# Patient Record
Sex: FEMALE | Race: WHITE | NOT HISPANIC OR LATINO | Employment: OTHER | URBAN - NONMETROPOLITAN AREA
[De-identification: names, ages, dates, MRNs, and addresses within clinical notes are randomized per-mention and may not be internally consistent; named-entity substitution may affect disease eponyms.]

---

## 2018-05-16 ENCOUNTER — OFFICE VISIT (OUTPATIENT)
Dept: NEUROSURGERY | Facility: CLINIC | Age: 68
End: 2018-05-16

## 2018-05-16 VITALS
WEIGHT: 204.6 LBS | BODY MASS INDEX: 40.17 KG/M2 | DIASTOLIC BLOOD PRESSURE: 92 MMHG | SYSTOLIC BLOOD PRESSURE: 168 MMHG | HEIGHT: 60 IN

## 2018-05-16 DIAGNOSIS — Z78.9 NON-SMOKER: ICD-10-CM

## 2018-05-16 DIAGNOSIS — M51.37 DEGENERATION OF LUMBAR OR LUMBOSACRAL INTERVERTEBRAL DISC: ICD-10-CM

## 2018-05-16 DIAGNOSIS — M48.061 SPINAL STENOSIS, LUMBAR REGION, WITHOUT NEUROGENIC CLAUDICATION: Primary | ICD-10-CM

## 2018-05-16 DIAGNOSIS — M54.6 PAIN IN THORACIC SPINE: ICD-10-CM

## 2018-05-16 PROCEDURE — 99204 OFFICE O/P NEW MOD 45 MIN: CPT | Performed by: NURSE PRACTITIONER

## 2018-05-16 RX ORDER — GABAPENTIN 600 MG/1
600 TABLET ORAL 3 TIMES DAILY
COMMUNITY

## 2018-05-16 RX ORDER — FUROSEMIDE 40 MG/1
40 TABLET ORAL 2 TIMES DAILY
COMMUNITY

## 2018-05-16 RX ORDER — DICLOFENAC SODIUM 75 MG/1
TABLET, DELAYED RELEASE ORAL
Refills: 1 | COMMUNITY
Start: 2018-04-26

## 2018-05-16 RX ORDER — SIMVASTATIN 40 MG
TABLET ORAL DAILY
Refills: 1 | COMMUNITY
Start: 2018-03-14

## 2018-05-16 RX ORDER — MECLIZINE HYDROCHLORIDE 25 MG/1
TABLET ORAL
Refills: 0 | COMMUNITY
Start: 2018-03-15

## 2018-05-16 RX ORDER — NITROFURANTOIN 25; 75 MG/1; MG/1
CAPSULE ORAL
COMMUNITY
Start: 2018-04-03

## 2018-05-16 RX ORDER — CETIRIZINE HYDROCHLORIDE 10 MG/1
TABLET ORAL DAILY
Refills: 0 | COMMUNITY
Start: 2018-03-15

## 2018-05-16 RX ORDER — GLIMEPIRIDE 2 MG/1
TABLET ORAL DAILY
Refills: 1 | COMMUNITY
Start: 2018-03-14

## 2018-05-16 NOTE — PATIENT INSTRUCTIONS

## 2018-05-16 NOTE — PROGRESS NOTES
Chief complaint:   Chief Complaint   Patient presents with   • Back Pain     Patient has been referred today for her back and right hip pain, she brings a CT scan with her today for review.  She states she has not tried any physical therapy for her back pain.         Subjective     HPI: This is a 67-year-old female patient who is referred to us by Dr. Blum for back and leg pain.  She is here to be evaluated.  The patient is not a good historian.  She says her back pain is been going on for about 3-4 years.  Pain in her back is constant.  Its worse when she is walking and better when she standing still.  She does complain of some leg pain bilaterally with the right being worse than the left.  She states this pain is intermittent.  It is worse at night and says that she does have cramping pain but has pain shooting down her legs to go back to her knees.  Nothing really makes it better.  Denies any bowel  Incontinence but does say that she has some bladder incontinence.  She says that she has been using a cane for quite some time but has also started using a walker as well.  She is right-hand dominant.  She is retired.  She is .  She says that she has been having some falls and does have difficulty with .  Rates her pain on scale 0-10 at a 10.  She says it does interfere with activities of daily living.  Past medical history is significant for diabetes.    Review of Systems   Eyes: Positive for photophobia, pain, discharge, itching and visual disturbance.   Respiratory: Positive for cough, choking, chest tightness and wheezing.    Cardiovascular: Positive for chest pain.   Gastrointestinal: Positive for abdominal distention, abdominal pain, anal bleeding, blood in stool and constipation.   Endocrine: Positive for polydipsia.   Genitourinary: Positive for dyspareunia, frequency, pelvic pain and urgency.   Musculoskeletal: Positive for back pain.   Allergic/Immunologic: Positive for environmental  "allergies and food allergies.   Psychiatric/Behavioral: Positive for hallucinations and sleep disturbance. The patient is nervous/anxious and is hyperactive.    All other systems reviewed and are negative.       Past Medical History:   Diagnosis Date   • Arthritis    • Diabetes    • Hypertension      Past Surgical History:   Procedure Laterality Date   • APPENDECTOMY     • CARPAL TUNNEL RELEASE Right    • PARTIAL HYSTERECTOMY     • REPLACEMENT TOTAL KNEE Left    • TONSILLECTOMY       Family History   Problem Relation Age of Onset   • Alcohol abuse Father    • Hypertension Father    • Asthma Brother      Social History   Substance Use Topics   • Smoking status: Never Smoker   • Smokeless tobacco: Never Used   • Alcohol use No       (Not in a hospital admission)  Allergies:  Penicillins and Latex    Objective      Vital Signs  /92 (BP Location: Right arm, Patient Position: Sitting)   Ht 152.4 cm (60\")   Wt 92.8 kg (204 lb 9.6 oz)   BMI 39.96 kg/m²     Physical Exam   Constitutional: She is oriented to person, place, and time. She appears well-developed and well-nourished.   HENT:   Head: Normocephalic.   Eyes: Conjunctivae, EOM and lids are normal. Pupils are equal, round, and reactive to light.   Neck: Normal range of motion.   Cardiovascular: Normal rate, regular rhythm and normal heart sounds.    Pulmonary/Chest: Effort normal and breath sounds normal.   Abdominal: Normal appearance.   Musculoskeletal: Normal range of motion.   Neurological: She is alert and oriented to person, place, and time. She has normal strength. She displays normal reflexes. No cranial nerve deficit or sensory deficit. Gait abnormal. GCS eye subscore is 4. GCS verbal subscore is 5. GCS motor subscore is 6.   Reflex Scores:       Tricep reflexes are 0 on the right side and 0 on the left side.       Bicep reflexes are 0 on the right side and 0 on the left side.       Brachioradialis reflexes are 0 on the right side and 0 on the left " side.       Patellar reflexes are 0 on the right side and 0 on the left side.       Achilles reflexes are 0 on the right side and 0 on the left side.  Unable to tandem walk.  No Hoffmans or clonus visualized.   Skin: Skin is warm.   Psychiatric: She has a normal mood and affect. Her speech is normal and behavior is normal. Thought content normal. Cognition and memory are normal.       Results Review: CT scan of the lumbar spine shows the patient does have lumbar disc degeneration lumbar stenosis present throughout her lumbar spine.  It does appear to be most prominent at L3-4 this far as the lumbar stenosis is present.  It does appear that she has a disc also find at L2-3.          Assessment/Plan: At this point I am a little concerned because the patients having quite a bit of difficulty with walking.  She almost has a myelopathic gait.  She is not really seem to be exhibiting any upper extremity myelopathic issues.  I am going to send  her for an MRI of her thoracic and lumbar spine to see if we can localize for her problem is a little better see why she is having the difficulty with her walking and why she is in so much pain.  Depending on what the MRI shows we send the patient to physical therapy.  BMI shows that she is very overweight.  BMI chart was given the patient.  She is a nonsmoker.      Cecille was seen today for back pain.    Diagnoses and all orders for this visit:    Spinal stenosis, lumbar region, without neurogenic claudication  -     MRI Lumbar Spine Without Contrast; Future    Pain in thoracic spine  -     MRI Thoracic Spine Without Contrast; Future    Degeneration of lumbar or lumbosacral intervertebral disc  -     MRI Lumbar Spine Without Contrast; Future    Non-smoker    BMI 39.0-39.9,adult          I discussed the patients findings and my recommendations with patient    Eric Mccormick, APRN  05/16/18  12:01 PM

## 2018-06-06 ENCOUNTER — HOSPITAL ENCOUNTER (OUTPATIENT)
Dept: MRI IMAGING | Facility: HOSPITAL | Age: 68
Discharge: HOME | End: 2018-06-06
Admitting: NURSE PRACTITIONER

## 2018-06-06 ENCOUNTER — OFFICE VISIT (OUTPATIENT)
Dept: NEUROSURGERY | Facility: CLINIC | Age: 68
End: 2018-06-06

## 2018-06-06 ENCOUNTER — HOSPITAL ENCOUNTER (OUTPATIENT)
Dept: MRI IMAGING | Facility: HOSPITAL | Age: 68
Discharge: HOME | End: 2018-06-06

## 2018-06-06 VITALS
SYSTOLIC BLOOD PRESSURE: 150 MMHG | BODY MASS INDEX: 40.23 KG/M2 | HEIGHT: 60 IN | WEIGHT: 204.9 LBS | DIASTOLIC BLOOD PRESSURE: 75 MMHG

## 2018-06-06 DIAGNOSIS — Z78.9 NON-SMOKER: ICD-10-CM

## 2018-06-06 DIAGNOSIS — R26.9 ABNORMALITY OF GAIT: ICD-10-CM

## 2018-06-06 DIAGNOSIS — M48.061 SPINAL STENOSIS, LUMBAR REGION, WITHOUT NEUROGENIC CLAUDICATION: ICD-10-CM

## 2018-06-06 DIAGNOSIS — M54.6 PAIN IN THORACIC SPINE: ICD-10-CM

## 2018-06-06 DIAGNOSIS — M51.37 DEGENERATION OF LUMBAR OR LUMBOSACRAL INTERVERTEBRAL DISC: ICD-10-CM

## 2018-06-06 DIAGNOSIS — M51.37 DEGENERATION OF LUMBAR OR LUMBOSACRAL INTERVERTEBRAL DISC: Primary | ICD-10-CM

## 2018-06-06 PROCEDURE — 72146 MRI CHEST SPINE W/O DYE: CPT

## 2018-06-06 PROCEDURE — 99213 OFFICE O/P EST LOW 20 MIN: CPT | Performed by: NURSE PRACTITIONER

## 2018-06-06 PROCEDURE — 72148 MRI LUMBAR SPINE W/O DYE: CPT

## 2018-06-06 NOTE — PROGRESS NOTES
"    Chief complaint:   Chief Complaint   Patient presents with   • Back Pain     Cecille returns today after MRIs of her lumbar and thoracic spine, she is here for the results.  States she is hurting more all the time and has difficulty with her legs and walking.         Subjective     HPI: This is a 67-year-old who we have seen in the past for back pain and difficulty with ambulation.  I will also look concerned about the patient having a myelopathic gait.  Her gait does appear to be better today.  She is walking with a cane and not with a walker today.  She says that she still does have pain in her back and her hip regions but the pain does appear to be improved compared to the last visit.  She rates her pain on scale 0-10 at a 4.  She says it can interfere with activities of daily living if he gets intense but her biggest complaint today is just very intermittent issues of walking.  She denies any bowel or bladder incontinence.  She does walk with a walker.    Review of Systems   Musculoskeletal: Positive for back pain.   Neurological: Positive for weakness.         Objective      Vital Signs  /75 (BP Location: Right arm, Patient Position: Sitting)   Ht 152.4 cm (60\")   Wt 92.9 kg (204 lb 14.4 oz)   BMI 40.02 kg/m²     Physical Exam   Constitutional: She is oriented to person, place, and time. She appears well-developed and well-nourished.   HENT:   Head: Normocephalic.   Eyes: Conjunctivae, EOM and lids are normal. Pupils are equal, round, and reactive to light.   Neck: Normal range of motion.   Cardiovascular: Normal rate, regular rhythm and normal heart sounds.    Pulmonary/Chest: Effort normal and breath sounds normal.   Abdominal: Normal appearance.   Musculoskeletal: Normal range of motion.   Neurological: She is alert and oriented to person, place, and time. She has normal strength and normal reflexes. She displays normal reflexes. No cranial nerve deficit or sensory deficit. Gait normal. GCS " eye subscore is 4. GCS verbal subscore is 5. GCS motor subscore is 6.   Unable to tandem walk.  No Hoffmans or clonus visualized.   Skin: Skin is warm.   Psychiatric: She has a normal mood and affect. Her speech is normal and behavior is normal. Thought content normal. Cognition and memory are normal.       Results Review: Patient has mild degeneration in her lumbar spine.  There is no significant area of stenosis and lumbar spine that I can appreciate.  No significant area of stenosis in her thoracic spine.  No evidence of any thing that would be causing symptoms of myelopathy and her thoracic or lumbar spine.  No cord signal change.  In a  view I do not see any significant compression in her cervical spine.          Assessment/Plan: At this point I am not really sure why the patient has periods of difficulty with her ambulation.  She states that she was told the past that she had had symptoms of Parkinson's and she was evaluated by neurologist but she does not remember what he told her at this time.  At this point I think would be beneficial for the patient to go see a neurologist to look and see if they can come up with a reason for the patient to have periods of difficulty with ambulation.  At this point we will need to see her on an as any basis.  BMI shows that she is very overweight.  BMI chart was given the patient.  She is a nonsmoker        Cecille was seen today for back pain.    Diagnoses and all orders for this visit:    Degeneration of lumbar or lumbosacral intervertebral disc  -     Ambulatory Referral to Neurology    Spinal stenosis, lumbar region, without neurogenic claudication    Abnormality of gait    Non-smoker    BMI 39.0-39.9,adult        I discussed the patients findings and my recommendations with patient  Eric Mccormick, OSWALDO  06/06/18  2:04 PM

## 2018-07-16 ENCOUNTER — TELEPHONE (OUTPATIENT)
Dept: NEUROLOGY | Age: 68
End: 2018-07-16

## 2019-04-09 ENCOUNTER — TELEPHONE (OUTPATIENT)
Dept: NEUROSURGERY | Age: 69
End: 2019-04-09

## 2019-04-10 ENCOUNTER — TELEPHONE (OUTPATIENT)
Dept: NEUROSURGERY | Age: 69
End: 2019-04-10

## 2019-04-10 NOTE — TELEPHONE ENCOUNTER
Referring physician: HERB Gilman  Who is completing questionnaire: Patient  Reason for referral/symptoms:   Headaches   No   Neck Pain   No  Back Pain   Yes: Mid  Lower  Leg pain left  Numbness left  Bowel/Bladder Incontinence  Gait Problems  VNS   No   CTS    No   Has the patient had any previous brain/neck/back surgeries:  Yes: What was the surgery: Disc Replacement  When was the surgery: 5649-8000, unsure  Who was the surgeon: Pt does not remember  What facility was the surgery: [facility]  Is this a second opinion: No  Why is the patient not following up with the previous surgeon: No longer practicing  Was a MRI performed within the last year:  Yes: Where was the imaging preformed: [facility], What Part of Body: L-Spine and Date of Service: 6/6/18  Other imaging obtained:  Yes CT Yes: Where was the imaging preformed: [facility], What Part of Body: L-Spine and Date of Service: 04/18/18  Patient able to have MRI  Yes  Has patient had physical therapy in the last year:   No  Pain Management:  No  Does Patient currently take any medication for pain:   Yes: Gabapentin and Muscle Relaxer  Employment Status:  Retired

## 2019-04-16 ENCOUNTER — OFFICE VISIT (OUTPATIENT)
Dept: NEUROSURGERY | Age: 69
End: 2019-04-16
Payer: MEDICARE

## 2019-04-16 ENCOUNTER — TELEPHONE (OUTPATIENT)
Dept: NEUROSURGERY | Age: 69
End: 2019-04-16

## 2019-04-16 ENCOUNTER — HOSPITAL ENCOUNTER (OUTPATIENT)
Dept: GENERAL RADIOLOGY | Age: 69
Discharge: HOME | End: 2019-04-16
Payer: MEDICARE

## 2019-04-16 VITALS
WEIGHT: 229 LBS | HEIGHT: 60 IN | DIASTOLIC BLOOD PRESSURE: 76 MMHG | HEART RATE: 89 BPM | SYSTOLIC BLOOD PRESSURE: 159 MMHG | BODY MASS INDEX: 44.96 KG/M2

## 2019-04-16 DIAGNOSIS — M51.37 DDD (DEGENERATIVE DISC DISEASE), LUMBOSACRAL: ICD-10-CM

## 2019-04-16 DIAGNOSIS — M54.6 CHRONIC MIDLINE THORACIC BACK PAIN: Primary | ICD-10-CM

## 2019-04-16 DIAGNOSIS — G89.29 CHRONIC MIDLINE LOW BACK PAIN WITHOUT SCIATICA: ICD-10-CM

## 2019-04-16 DIAGNOSIS — Z98.1 S/P CERVICAL SPINAL FUSION: ICD-10-CM

## 2019-04-16 DIAGNOSIS — M54.50 CHRONIC MIDLINE LOW BACK PAIN WITHOUT SCIATICA: ICD-10-CM

## 2019-04-16 DIAGNOSIS — G89.29 CHRONIC MIDLINE THORACIC BACK PAIN: Primary | ICD-10-CM

## 2019-04-16 DIAGNOSIS — M51.36 DDD (DEGENERATIVE DISC DISEASE), LUMBAR: ICD-10-CM

## 2019-04-16 PROCEDURE — 99204 OFFICE O/P NEW MOD 45 MIN: CPT | Performed by: NURSE PRACTITIONER

## 2019-04-16 PROCEDURE — 1123F ACP DISCUSS/DSCN MKR DOCD: CPT | Performed by: NURSE PRACTITIONER

## 2019-04-16 PROCEDURE — 4040F PNEUMOC VAC/ADMIN/RCVD: CPT | Performed by: NURSE PRACTITIONER

## 2019-04-16 PROCEDURE — G8417 CALC BMI ABV UP PARAM F/U: HCPCS | Performed by: NURSE PRACTITIONER

## 2019-04-16 PROCEDURE — 1090F PRES/ABSN URINE INCON ASSESS: CPT | Performed by: NURSE PRACTITIONER

## 2019-04-16 PROCEDURE — 1036F TOBACCO NON-USER: CPT | Performed by: NURSE PRACTITIONER

## 2019-04-16 PROCEDURE — G8400 PT W/DXA NO RESULTS DOC: HCPCS | Performed by: NURSE PRACTITIONER

## 2019-04-16 PROCEDURE — 72110 X-RAY EXAM L-2 SPINE 4/>VWS: CPT

## 2019-04-16 PROCEDURE — G8427 DOCREV CUR MEDS BY ELIG CLIN: HCPCS | Performed by: NURSE PRACTITIONER

## 2019-04-16 PROCEDURE — 3017F COLORECTAL CA SCREEN DOC REV: CPT | Performed by: NURSE PRACTITIONER

## 2019-04-16 RX ORDER — DICLOFENAC SODIUM 75 MG/1
1 TABLET, DELAYED RELEASE ORAL 2 TIMES DAILY
COMMUNITY
Start: 2018-04-26

## 2019-04-16 RX ORDER — GABAPENTIN 300 MG/1
1 CAPSULE ORAL DAILY
Refills: 0 | COMMUNITY
Start: 2019-02-05 | End: 2019-07-08

## 2019-04-16 RX ORDER — LISINOPRIL 10 MG/1
1 TABLET ORAL DAILY
Refills: 1 | COMMUNITY
Start: 2019-03-29 | End: 2019-04-16

## 2019-04-16 RX ORDER — FUROSEMIDE 20 MG/1
20 TABLET ORAL DAILY
COMMUNITY

## 2019-04-16 RX ORDER — MECLIZINE HYDROCHLORIDE 25 MG/1
1 TABLET ORAL 3 TIMES DAILY PRN
Refills: 0 | COMMUNITY
Start: 2019-03-20

## 2019-04-16 RX ORDER — PIOGLITAZONEHYDROCHLORIDE 45 MG/1
1 TABLET ORAL DAILY
Refills: 0 | COMMUNITY
Start: 2019-02-05

## 2019-04-16 RX ORDER — GLIMEPIRIDE 2 MG/1
2 TABLET ORAL
COMMUNITY

## 2019-04-16 RX ORDER — EZETIMIBE 10 MG/1
1 TABLET ORAL DAILY
Refills: 2 | COMMUNITY
Start: 2019-03-29

## 2019-04-16 RX ORDER — AMITRIPTYLINE HYDROCHLORIDE 25 MG/1
25 TABLET, FILM COATED ORAL NIGHTLY
COMMUNITY

## 2019-04-16 SDOH — HEALTH STABILITY: MENTAL HEALTH: HOW OFTEN DO YOU HAVE A DRINK CONTAINING ALCOHOL?: MONTHLY OR LESS

## 2019-04-16 NOTE — PROGRESS NOTES
[facility] Neurosurgery  Office Visit      Chief Complaint   Patient presents with    New Patient    Back Pain     LBP    Leg Pain     Lt leg    Arm Pain     LT arm       HISTORY OF PRESENT ILLNESS:    Shavonne Finnegan is a 76 y.o. female who presents with low thoracic or upper lumbar pain for almost 1 year. She had a MRI of her thoracic and lumbar spine, she was seen by the neurosurgical team at [facility] and was told that her imaging did not explain the reason for her difficulty walking. The pain does not radiate. Her pain is mostly located in the back. The patient complains of numbness and tingling in all four extremities mostly on the left. She has muscle aches and joint pain at night. She does have trouble picking up objects. She does have trouble gripping objects. She reports that she fatigues easily. She has a history of frequent falls but states that she has not fallen this year. According to the electronic medical record she has had appointments in our neurology department in 2017 and 2018 and has had to cancel due to family reasons. She has yet to see a neurologist.      Today she reports that she is dizzy and this began early this afternoon. She reports that she is having trouble speaking due to the feeling that her \"tongue is in the way\". She reports that she has a headache in the frontal region. The patient has underwent a non-operative treatment course that has included:  NSAIDs (voltaren)  Tylenol  Muscle Relaxers  Gabapentin      Of note she does not use tobacco and does not take blood thinning medications.                Past Medical History:   Diagnosis Date    Arthritis     Diabetes mellitus ([address]     Fibromyalgia     GERD (gastroesophageal reflux disease)     H/O: hysterectomy     Hyperlipidemia     Hypertension     Mitral insufficiency     Parkinson disease ([address]        Past Surgical History:   Procedure Laterality Date    APPENDECTOMY      BLADDER SUSPENSION N/A  CARPAL TUNNEL RELEASE      CERVICAL DISC SURGERY      COLONOSCOPY      HYSTERECTOMY      JOINT REPLACEMENT      TONSILLECTOMY         Current Outpatient Medications   Medication Sig Dispense Refill    gabapentin (NEURONTIN) 300 MG capsule Take 1 capsule by mouth daily. 0    diclofenac (VOLTAREN) 75 MG EC tablet Take 1 tablet by mouth 2 times daily      pioglitazone (ACTOS) 45 MG tablet Take 1 tablet by mouth daily  0    meclizine (ANTIVERT) 25 MG tablet Take 1 tablet by mouth 3 times daily as needed  0    ezetimibe (ZETIA) 10 MG tablet Take 1 tablet by mouth daily  2    amitriptyline (ELAVIL) 25 MG tablet Take 25 mg by mouth nightly      glimepiride (AMARYL) 2 MG tablet Take 2 mg by mouth every morning (before breakfast)      furosemide (LASIX) 20 MG tablet Take 20 mg by mouth daily      Cyanocobalamin (B-12 PO) Take by mouth      Misc Natural Products (OSTEO BI-FLEX JOINT SHIELD PO) Take by mouth       No current facility-administered medications for this visit.         Allergies:  Latex and Penicillins    Social History:   Social History     Tobacco Use   Smoking Status Never Smoker   Smokeless Tobacco Never Used     Social History     Substance and Sexual Activity   Alcohol Use Yes    Frequency: Monthly or less    Comment: rarely         Family History:   Family History   Problem Relation Age of Onset    High Blood Pressure Father     Cancer Maternal Grandmother        REVIEW OF SYSTEMS:  Review of Systems   Constitution- Fever, Weight loss, Fatigue and Sweating  Skin- Itching  Hearing/ Nose/ Throat- Hearing Loss, Congestion and Sinus pain  Eyes- Blurred vision, Sensitivity to light and Eye pain  Cardiovascular- Leg pain when ambulating and Leg swelling  Respiratory- None  Gastrointestinal- Diarrhea  Genitourinary- Urgency, Frequency and Blood in urine  Musculoskeletal- Muscle pain, Neck pain, Back pain and Joint pain  Endocrine/ Hematology/ Allergy- Environmental allergies and Excessive Thirst  Neurological- Dizziness, Headaches, Tingling, Tremor, Speech changes, Focal weakness and Weakness  Psychiatric- Memory loss        PHYSICAL EXAM:  Vitals:    04/16/19 1357   BP: (!) 159/76   Pulse: 89     Constitutional: appears well-developed and well-nourished. Eyes - conjunctiva normal.  Pupils react to light  Ear, nose, throat -hearing intact to finger rub, No scars, masses, or lesions over external nose or ears, no atrophy oftongue  Neck-symmetric, no masses noted, no jugular vein distension  Respiration- chest wall appears symmetric, good expansion, normal effort without use of accessory muscles  Musculoskeletal - no significantwasting of muscles noted, no bony deformities, gait no gross ataxia  Extremities-no clubbing, cyanosis oredema  Skin - warm, dry, and intact. No rash, erythema, or pallor. Psychiatric - mood, affect, and behavior appear normal.     Neurologic Examination  Awake, Alert and oriented x 4  CN II-XII grossly intact  No Pronator drift noted   Normal speech pattern, following commands    Motor:  RIGHT: hand grasp 5/5    finger extension 5/5    bicep 5/5    triceps 5/5    deltoid 5/5      iliopsoas 5/5    knee flexor 5/5    knee extension 5/5    EHL/dorsiflexion 5/5    plantar flexion 5/5    LEFT:   hand grasp 5/5    finger extension 5/5    bicep 5/5    triceps 5/5    deltoid 5/5      iliopsoas 5/5    knee flexor 5/5    knee extension 5/5    EHL/dorsiflexion 5/5    plantar flexion 5/5    Decrease to pinprick sensation BLE patchy loss, non-dermatomal   Reflexes are 2+ and symmetric  No myofacial tenderness to palpation  Antalgic Gait pattern      DATA and IMAGING:    Nursing/pcp notes, imaging, labs, and vitals reviewed.      PT,OT and/or speech notes reviewed    Lab Results   Component Value Date    WBC 6.4 12/10/2015    HGB 12.7 12/10/2015    HCT 39.5 12/10/2015    MCV 91.0 12/10/2015     12/10/2015     Lab Results   Component Value Date     12/10/2015    K 3.7 12/10/2015     12/10/2015    CO2 25 12/10/2015    BUN 15 12/10/2015    CREATININE 0.6 12/10/2015    GLUCOSE 107 12/10/2015    CALCIUM 9.0 12/10/2015    PROT 6.9 12/08/2015    LABALBU 4.5 12/08/2015    BILITOT 0.4 12/08/2015    ALKPHOS 62 12/08/2015    AST 22 12/08/2015    ALT 27 12/08/2015    LABGLOM >60 12/10/2015    GLOB 2.4 12/08/2015   No results found for: INR, PROTIME    MRI Lumbar Spine (6/06/2018) [facility]  I have personally reviewed these imagesand my interpretation is:  DDD throughout    MRI Thoracic Spine (6/06/2018) [facility]   I have personally reviewed the images and my interpretation is: There is evidence of previous ACDF of C5-6  There is a scoliotic deformity that is mild in nature  No significant neural element compression       ASSESSMENT:    Christian Tello is a 76 y.o. female with complaints of low thoracic and upper lumbar back pain with gait instability. ICD-10-CM    1. Chronic midline thoracic back pain M54.6     G89.29    2. Chronic midline low back pain without sciatica M54.5 XR LUMBAR SPINE (MIN 4 VIEWS)    G89.29    3. DDD (degenerative disc disease), lumbar M51.36    4. DDD (degenerative disc disease), lumbosacral M51.37    5. S/P cervical spinal fusion Z98.1        PLAN:  We have discussed and reviewed the results of the MRI thoracic and lumbar spine with Mrs. Hough at length. We explained that there is no pathology appreciated on the imaging of the spine that would explain her gait instability. She does have a few small disc bulges in the thoracic spine that could cause her back pain, however, there is no neural element compression that would warrant a neurosurgical intervention. We recommend that she see a neurologist.  Regarding her dizziness and somewhat slurred speech upon neurologic exam she did not have any focal deficits.     -XR lumbar flex/ex to rule out instability   -Follow up as needed if XR is stable      Note: A total of >50% (23 minutes) of 45 minutes was spent discussing the pathophysiology and treatment and/or coordination of care of the above diagnoses.       HERB Little

## 2019-04-17 DIAGNOSIS — R53.83 FATIGUE, UNSPECIFIED TYPE: ICD-10-CM

## 2019-04-17 DIAGNOSIS — R26.81 UNSTABLE GAIT: Primary | ICD-10-CM

## 2019-04-17 DIAGNOSIS — R29.6 FREQUENT FALLS: ICD-10-CM

## 2019-04-18 ENCOUNTER — TELEPHONE (OUTPATIENT)
Dept: NEUROSURGERY | Age: 69
End: 2019-04-18

## 2019-07-08 ENCOUNTER — OFFICE VISIT (OUTPATIENT)
Dept: NEUROLOGY | Age: 69
End: 2019-07-08
Payer: MEDICARE

## 2019-07-08 VITALS
WEIGHT: 232 LBS | HEART RATE: 78 BPM | SYSTOLIC BLOOD PRESSURE: 189 MMHG | BODY MASS INDEX: 45.55 KG/M2 | DIASTOLIC BLOOD PRESSURE: 83 MMHG | HEIGHT: 60 IN

## 2019-07-08 DIAGNOSIS — R25.1 TREMOR: ICD-10-CM

## 2019-07-08 DIAGNOSIS — M79.7 FIBROMYALGIA: ICD-10-CM

## 2019-07-08 DIAGNOSIS — E66.01 MORBID OBESITY (HCC): ICD-10-CM

## 2019-07-08 DIAGNOSIS — M79.7 FIBROMYALGIA: Primary | ICD-10-CM

## 2019-07-08 PROCEDURE — 4040F PNEUMOC VAC/ADMIN/RCVD: CPT | Performed by: PSYCHIATRY & NEUROLOGY

## 2019-07-08 PROCEDURE — 3017F COLORECTAL CA SCREEN DOC REV: CPT | Performed by: PSYCHIATRY & NEUROLOGY

## 2019-07-08 PROCEDURE — G8417 CALC BMI ABV UP PARAM F/U: HCPCS | Performed by: PSYCHIATRY & NEUROLOGY

## 2019-07-08 PROCEDURE — 1123F ACP DISCUSS/DSCN MKR DOCD: CPT | Performed by: PSYCHIATRY & NEUROLOGY

## 2019-07-08 PROCEDURE — 99204 OFFICE O/P NEW MOD 45 MIN: CPT | Performed by: PSYCHIATRY & NEUROLOGY

## 2019-07-08 PROCEDURE — G8400 PT W/DXA NO RESULTS DOC: HCPCS | Performed by: PSYCHIATRY & NEUROLOGY

## 2019-07-08 PROCEDURE — G8427 DOCREV CUR MEDS BY ELIG CLIN: HCPCS | Performed by: PSYCHIATRY & NEUROLOGY

## 2019-07-08 PROCEDURE — 1036F TOBACCO NON-USER: CPT | Performed by: PSYCHIATRY & NEUROLOGY

## 2019-07-08 PROCEDURE — 1090F PRES/ABSN URINE INCON ASSESS: CPT | Performed by: PSYCHIATRY & NEUROLOGY

## 2019-07-08 RX ORDER — SIMVASTATIN 40 MG
40 TABLET ORAL NIGHTLY
COMMUNITY

## 2019-07-08 RX ORDER — VITAMIN B COMPLEX
1 CAPSULE ORAL DAILY
COMMUNITY

## 2019-07-08 RX ORDER — OMEPRAZOLE 20 MG/1
40 CAPSULE, DELAYED RELEASE ORAL DAILY
COMMUNITY

## 2019-07-08 RX ORDER — CETIRIZINE HYDROCHLORIDE 10 MG/1
10 TABLET ORAL DAILY
COMMUNITY

## 2019-07-08 RX ORDER — CALCIUM CARBONATE 500(1250)
500 TABLET ORAL DAILY
COMMUNITY

## 2019-07-08 RX ORDER — TURMERIC 400 MG
CAPSULE ORAL
COMMUNITY

## 2019-07-08 NOTE — PROGRESS NOTES
Review of Systems    Constitutional - No fever or chills. No diaphoresis or significant fatigue. HENT -  yes tinnitus or significant hearing loss. Eyes - no sudden vision change or eye pain  Respiratory - no significant shortness of breath or cough  Cardiovascular - no chest pain No palpitations or significant leg swelling  Gastrointestinal - no abdominal swelling or pain. Genitourinary - No difficulty urinating, dysuria  Musculoskeletal - no back pain or myalgia. Skin - no color change or rash  Neurologic - No seizures. No lateralizing weakness. Hematologic - no easy bruising or excessive bleeding. Psychiatric - no severe anxiety or nervousness. All other review of systems are negative.

## 2019-07-11 LAB
ACETYLCHOLINE BINDING ANTIBODY: 0 NMOL/L (ref 0–0.4)
ACETYLCHOLINE BLOCKING AB: 5 % (ref 0–26)

## 2019-07-12 LAB — ACETYLCHOL MODUL AB: 0 %

## 2019-07-13 NOTE — PROGRESS NOTES
Chief Complaint   Patient presents with    New Patient     Referred by Rona Rabago for unstable gait, frequent falls, and fatigue       Whitney Meek is a 76y.o. year old female who is seen for evaluation of a multitude of symptoms. She was referred by neurosurgery. She had a negative MRI of the thoracic and lumbar spines recently. She cares for her  who has dementia and is under a lot of stress. She complains of occasional tremors in her arms. Has occasional swallowing trouble. She feels weak all over. Has fibromyalgia. Has a relatively positive review of systems. Old records are reviewed in detail. We had a long talk regarding all of the above.     Active Ambulatory Problems     Diagnosis Date Noted    Chest pain     Chest pain at rest 12/09/2015    Type 2 diabetes mellitus without complication ([address] 59/58/3213    Hyperlipidemia 12/09/2015    HTN (hypertension) 12/09/2015     Resolved Ambulatory Problems     Diagnosis Date Noted    No Resolved Ambulatory Problems     Past Medical History:   Diagnosis Date    Arthritis     Diabetes mellitus ([address]     Fibromyalgia     GERD (gastroesophageal reflux disease)     H/O: hysterectomy     Hypertension     Mitral insufficiency     Parkinson disease ([address]        Past Surgical History:   Procedure Laterality Date    APPENDECTOMY      BLADDER SUSPENSION N/A     CARPAL TUNNEL RELEASE      CERVICAL DISC SURGERY      COLONOSCOPY      HYSTERECTOMY      JOINT REPLACEMENT      TONSILLECTOMY         Family History   Problem Relation Age of Onset    High Blood Pressure Father     Cancer Maternal Grandmother        Allergies   Allergen Reactions    Latex     Penicillins      As a child       Social History     Socioeconomic History    Marital status:      Spouse name: Not on file    Number of children: Not on file    Years of education: Not on file    Highest education level: Not on file   Occupational History    Not on Romberg sign    Coordination  Finger to nose and CLAUDIA-unremarkable    No results found for: [id number]  Lab Results   Component Value Date    WBC 6.4 12/10/2015    HGB 12.7 12/10/2015    HCT 39.5 12/10/2015    MCV 91.0 12/10/2015     12/10/2015     Lab Results   Component Value Date     12/10/2015    K 3.7 12/10/2015     12/10/2015    CO2 25 12/10/2015    BUN 15 12/10/2015    CREATININE 0.6 12/10/2015    GLUCOSE 107 12/10/2015    CALCIUM 9.0 12/10/2015    PROT 6.9 12/08/2015    LABALBU 4.5 12/08/2015    BILITOT 0.4 12/08/2015    ALKPHOS 62 12/08/2015    AST 22 12/08/2015    ALT 27 12/08/2015    LABGLOM >60 12/10/2015    GLOB 2.4 12/08/2015           Assessment    ICD-10-CM    1. Fibromyalgia M79.7 Acetylcholine Receptor, Modulating     Acetylcholine Receptor, Blocking     Acetylcholine Receptor, Binding     MRI Brain WO Contrast   2. Morbid obesity ([facility] E66.01    3. Tremor R25.1 Acetylcholine Receptor, Modulating     Acetylcholine Receptor, Blocking     Acetylcholine Receptor, Binding     MRI Brain WO Contrast       This patient has a nonfocal neurological examination. I have requested an MRI of the head to help rule out multiple sclerosis, etc.  Acetylcholine receptor antibodies were ordered. If these are negative no further neurological work-up suggested.     Plan  Orders Placed This Encounter   Procedures    MRI Brain WO Contrast     Assess for hydrocephalus     Standing Status:   Future     Standing Expiration Date:   7/7/2020     Order Specific Question:   Reason for exam:     Answer:   memory loss/dementia    Acetylcholine Receptor, Modulating     Standing Status:   Future     Number of Occurrences:   1     Standing Expiration Date:   7/7/2020    Acetylcholine Receptor, Blocking     Standing Status:   Future     Number of Occurrences:   1     Standing Expiration Date:   7/7/2020    Acetylcholine Receptor, Binding     Standing Status:   Future     Number of Occurrences:   1     Standing

## 2019-08-23 ENCOUNTER — HOSPITAL ENCOUNTER (OUTPATIENT)
Dept: MRI IMAGING | Age: 69
Discharge: HOME | End: 2019-08-23
Payer: MEDICARE

## 2019-08-23 DIAGNOSIS — R25.1 TREMOR: ICD-10-CM

## 2019-08-23 DIAGNOSIS — M79.7 FIBROMYALGIA: ICD-10-CM

## 2019-08-23 PROCEDURE — 70551 MRI BRAIN STEM W/O DYE: CPT

## 2019-08-28 ENCOUNTER — TELEPHONE (OUTPATIENT)
Dept: NEUROSURGERY | Age: 69
End: 2019-08-28

## 2019-08-29 NOTE — TELEPHONE ENCOUNTER
Relatively unremarkable. There are some chronic vascular changes probably related to high cholesterol and hypertension. I recommend a coated baby aspirin daily.

## 2019-09-10 ENCOUNTER — TELEPHONE (OUTPATIENT)
Dept: NEUROLOGY | Age: 69
End: 2019-09-10

## 2020-07-16 ENCOUNTER — OFFICE VISIT (OUTPATIENT)
Dept: NEUROSURGERY | Age: 70
End: 2020-07-16
Payer: MEDICARE

## 2020-07-16 VITALS
SYSTOLIC BLOOD PRESSURE: 158 MMHG | HEIGHT: 60 IN | BODY MASS INDEX: 45.16 KG/M2 | DIASTOLIC BLOOD PRESSURE: 76 MMHG | WEIGHT: 230 LBS | HEART RATE: 88 BPM

## 2020-07-16 DIAGNOSIS — R51.9 NONINTRACTABLE HEADACHE, UNSPECIFIED CHRONICITY PATTERN, UNSPECIFIED HEADACHE TYPE: ICD-10-CM

## 2020-07-16 LAB
C-REACTIVE PROTEIN: 0.62 MG/DL (ref 0–0.5)
SEDIMENTATION RATE, ERYTHROCYTE: 17 MM/HR (ref 0–25)

## 2020-07-16 PROCEDURE — 3017F COLORECTAL CA SCREEN DOC REV: CPT | Performed by: NURSE PRACTITIONER

## 2020-07-16 PROCEDURE — 99214 OFFICE O/P EST MOD 30 MIN: CPT | Performed by: NURSE PRACTITIONER

## 2020-07-16 PROCEDURE — 1036F TOBACCO NON-USER: CPT | Performed by: NURSE PRACTITIONER

## 2020-07-16 PROCEDURE — 4040F PNEUMOC VAC/ADMIN/RCVD: CPT | Performed by: NURSE PRACTITIONER

## 2020-07-16 PROCEDURE — G8417 CALC BMI ABV UP PARAM F/U: HCPCS | Performed by: NURSE PRACTITIONER

## 2020-07-16 PROCEDURE — 1123F ACP DISCUSS/DSCN MKR DOCD: CPT | Performed by: NURSE PRACTITIONER

## 2020-07-16 PROCEDURE — G8400 PT W/DXA NO RESULTS DOC: HCPCS | Performed by: NURSE PRACTITIONER

## 2020-07-16 PROCEDURE — 1090F PRES/ABSN URINE INCON ASSESS: CPT | Performed by: NURSE PRACTITIONER

## 2020-07-16 PROCEDURE — G8427 DOCREV CUR MEDS BY ELIG CLIN: HCPCS | Performed by: NURSE PRACTITIONER

## 2020-07-16 NOTE — PROGRESS NOTES
[facility] Neurology Office Note      Patient:   Moraima Madrid  MR#:    [id number]  Account Number:                         YOB: 1950  Date of Evaluation:  7/16/2020  Time of Note:                          4:40 PM  Primary/Referring Physician:  Sharath Perrin   Consulting Physician:  HERB Fournier    FOLLOW UP VISIT    Chief Complaint   Patient presents with    New Patient    Blurred Vision     c/o    Hearing Problem     c/o    Headache     c/o     HISTORY OF PRESENT ILLNESS    Moraima Madrid is a 71y.o. year old female here for evaluation of dizziness, visual changes, headaches and hearing changes. She reports that during April 2020 she saw a bright light flash and then noted a kaleidoscope type vision change in her left eye. She has seen ophthalmology and told she had a stroke. She is getting injections in this eye now with opthalmology. She denies focal weakness with the vision change. Possible facial drooping noted. Denies speech changes. She does note intermittent dizziness. She feels like the room is spinning around her. Sometimes more positional in nature. She typically has to sit up on the side of the bed for a few minutes. She correlates some dizziness with lower blood sugar or hypertension. She notes headaches most days. Pain is typical retro orbital with little radiation of pain. She notes nausea, light/sound sensitivity with some headaches. She denies vision loss with headaches. She denies TA tenderness or jaw claudication. Feels like most headaches are sinus related. She has never taken migraine preventative medications. She is on Amitriptyline nightly for sleep. She will take Tylenol for headaches with some improvement. She is seeing audiology tomorrow for hearing evaluation.      Past Medical History:   Diagnosis Date    Arthritis     Diabetes mellitus ([address]     Fibromyalgia     GERD (gastroesophageal reflux disease)     H/O: hysterectomy     Hyperlipidemia  Hypertension     Mitral insufficiency     Parkinson disease [address] [address] [facility]        Past Surgical History:   Procedure Laterality Date    APPENDECTOMY      BLADDER SUSPENSION N/A     CARPAL TUNNEL RELEASE      CERVICAL DISC SURGERY      COLONOSCOPY      HYSTERECTOMY      JOINT REPLACEMENT      TONSILLECTOMY         Family History   Problem Relation Age of Onset    High Blood Pressure Father     Cancer Maternal Grandmother        Social History     Socioeconomic History    Marital status:      Spouse name: Not on file    Number of children: Not on file    Years of education: Not on file    Highest education level: Not on file   Occupational History    Not on file   Social Needs    Financial resource strain: Not on file    Food insecurity     Worry: Not on file     Inability: Not on file    Transportation needs     Medical: Not on file     Non-medical: Not on file   Tobacco Use    Smoking status: Never Smoker    Smokeless tobacco: Never Used   Substance and Sexual Activity    Alcohol use: Yes     Frequency: Monthly or less     Comment: rarely    Drug use: No    Sexual activity: Not on file   Lifestyle    Physical activity     Days per week: Not on file     Minutes per session: Not on file    Stress: Not on file   Relationships    Social connections     Talks on phone: Not on file     Gets together: Not on file     Attends Latter-day service: Not on file     Active member of club or organization: Not on file     Attends meetings of clubs or organizations: Not on file     Relationship status: Not on file    Intimate partner violence     Fear of current or ex partner: Not on file     Emotionally abused: Not on file     Physically abused: Not on file     Forced sexual activity: Not on file   Other Topics Concern    Not on file   Social History Narrative    Not on file       Current Outpatient Medications   Medication Sig Dispense Refill    calcium carbonate (OSCAL) 500 MG TABS tablet Take 500 mg by mouth daily      simvastatin (ZOCOR) 40 MG tablet Take 40 mg by mouth nightly      b complex vitamins capsule Take 1 capsule by mouth daily      cetirizine (ZYRTEC) 10 MG tablet Take 10 mg by mouth daily      omeprazole (PRILOSEC) 20 MG delayed release capsule Take 40 mg by mouth daily      Turmeric 400 MG CAPS Take by mouth      diclofenac (VOLTAREN) 75 MG EC tablet Take 1 tablet by mouth 2 times daily      pioglitazone (ACTOS) 45 MG tablet Take 1 tablet by mouth daily  0    meclizine (ANTIVERT) 25 MG tablet Take 1 tablet by mouth 3 times daily as needed  0    ezetimibe (ZETIA) 10 MG tablet Take 1 tablet by mouth daily  2    amitriptyline (ELAVIL) 25 MG tablet Take 25 mg by mouth nightly      glimepiride (AMARYL) 2 MG tablet Take 2 mg by mouth every morning (before breakfast)      furosemide (LASIX) 20 MG tablet Take 20 mg by mouth daily       No current facility-administered medications for this visit. Allergies   Allergen Reactions    Latex     Penicillins      As a child       REVIEW OF SYSTEMS  Constitutional: []? Fever []? Sweat []? Chills []? Recent Injury [x]? Denies all unless marked  HEENT:[x]? Headache  []? Head Injury/Hearing Loss  []? Sore Throat  []? Ear Ache/Dizziness  [x]? Denies all unless marked  Spine:  []? Neck pain  []? Back pain  []? Sciaticia  [x]? Denies all unless marked  Cardiovascular:[]? Heart Disease []? Chest Pain []? Palpitations  [x]? Denies all unless marked  Pulmonary: []? Shortness of Breath []? Cough   [x]? Denies all unless marke  Gastrointestinal: []? Nausea  []? Vomiting  []? Abdominal Pain  []? Constipation  []? Diarrhea  []? Dark Bloody Stools  [x]? Denies all unless marked  Psychiatric/Behavioral:[]? Depression []? Anxiety [x]? Denies all unless marked  Genitourinary:   []? Frequency  []? Urgency  []? Incontinence []? Pain with Urination  [x]? Denies all unless marked  Extremities: []? Pain  []? Swelling  [x]?  Denies all unless marked  Musculoskeletal: []? Muscle Pain  []? Joint Pain  []? Arthritis []? Muscle Cramps []? Muscle Twitches  [x]? Denies all unless marked  Sleep: []? Insomnia []? Snoring []? Restless Legs []? Sleep Apnea  []? Daytime Sleepiness  [x]? Denies all unless marked  Skin:[]? Rash []? Skin Discoloration [x]? Denies all unless marked   Neurological: [x]? Visual Disturbance/Memory Loss []? Loss of Balance []? Slurred Speech/Weakness []? Seizures  []? Vertigo/Dizziness [x]? Denies all unless marked    The MA has completed the ROS with the patient. I have reviewed it in its' entirety with the patient and agree with the documentation. PHYSICAL EXAM  BP (!) 158/76   Pulse 88   Ht 5' (1.524 m)   Wt 230 lb (104.3 kg)   BMI 44.92 kg/m²       Constitutional - No acute distress    HEENT- Conjunctiva normal.  No scars, masses, or lesions over external nose or ears, no neck masses noted, no jugular vein distension, no bruit  Cardiac- Regular rate and rhythm  Pulmonary- Good expansion, normal effort without use of accessory muscles  Musculoskeletal - No significant wasting of muscles noted, no bony deformities  Extremities - No clubbing, cyanosis or edema  Skin - Warm, dry, and intact. No rash, erythema, or pallor  Psychiatric - Mood, affect, and behavior appear normal      NEUROLOGICAL EXAM     Mental status   [x] Awake, alert, oriented   [x]Affect attention and concentration appear appropriate  [x]Recent and remote memory appears unremarkable  [x]Speech normal without dysarthria or aphasia, comprehension and repetition intact.    COMMENTS:    Cranial Nerves [x]No VF deficit to confrontation,  no papilledema on fundoscopic exam.  [x]PERRLA, EOMI, no nystagmus, conjugate eye movements, no ptosis  [x]Face symmetric  [x]Facial sensation intact  [x]Tongue midline no atrophy or fasciculations present  [x]Palate midline, hearing to finger rub normal bilaterally  [x]Shoulder shrug and SCM testing normal bilaterally  COMMENTS:   Motor   [x]5/5 strength x 4 preserved. The paraventricular white matter on the T2 and FLAIR sequence there is increased signal noted. This most consistent with chronic microvascular ischemic change. Mild cortical volume loss is noted. . No abnormal extra axial fluid collections are noted. No restriction of diffusion is present. Proximal cervical spinal cord, brainstem, and cerebellum are unremarkable. Normal cerebrovascular flow voids are seen. Bilateral globes and orbits are normal in appearance. No abnormal signal is noted in the mastoid air cells or paranasal sinuses. Impression: Changes of aging with no acute intracranial abnormality Signed by Dr Cheryl Arrington on 8/24/2019 9:12 AM    Reviewed prior neurology records     ASSESSMENT:    Pauline Mauricio is a 71y.o. year old female here for evaluation of headaches, vision changes and dizziness. Exam today is non focal. Suspect that dizziness is multi factorial with medications, chronic diseases and possible vertigo component playing a role. She is complaining of vision change in the left eye only, reports she was told that she had a stroke after seeing ophthalmology. Will plan for further work up today with MRI brain and lab work. Will try prn Nurtec for migraines, consider preventative once work up is completed. ICD-10-CM    1. Nonintractable headache, unspecified chronicity pattern, unspecified headache type  R51 Sedimentation Rate     C-Reactive Protein     MRI Brain W WO Contrast   2. Vision changes  H53.9 MRI Brain W WO Contrast     PLAN:  1. MRI brain   2. Sed rate, CRP   3. Records from [facility]- saw Dr. Jarad Matamoros   4. Sample of Nurtec given to patient, if effective patient will call. 5. Return in about 3 months (around 10/16/2020) for follow up, sooner if worsening. Chery Tse DNP, APRN    Note:  A total of >50% (>13 minutes) of 25 minutes was spent discussing the pathophysiology and treatment and/or coordination of care of the above diagnoses. This dictation was generated by voice recognition computer software. Although all attempts are made to edit the dictation for accuracy, there may be errors in the transcription that are not intended.

## 2020-07-16 NOTE — PROGRESS NOTES
REVIEW OF SYSTEMS    Constitutional: []Fever []Sweat []Chills [] Recent Injury [x] Denies all unless marked  HEENT:[x]Headache  [] Head Injury/Hearing Loss  [] Sore Throat  [] Ear Ache/Dizziness  [x] Denies all unless marked  Spine:  [] Neck pain  [] Back pain  [] Sciaticia  [x] Denies all unless marked  Cardiovascular:[]Heart Disease []Chest Pain [] Palpitations  [x] Denies all unless marked  Pulmonary: []Shortness of Breath []Cough   [x] Denies all unless marke  Gastrointestinal: []Nausea  []Vomiting  []Abdominal Pain  []Constipation  []Diarrhea  []Dark Bloody Stools  [x] Denies all unless marked  Psychiatric/Behavioral:[] Depression [] Anxiety [x] Denies all unless marked  Genitourinary:   [] Frequency  [] Urgency  [] Incontinence [] Pain with Urination  [x] Denies all unless marked  Extremities: []Pain  []Swelling  [x] Denies all unless marked  Musculoskeletal: [] Muscle Pain  [] Joint Pain  [] Arthritis [] Muscle Cramps [] Muscle Twitches  [x] Denies all unless marked  Sleep: [] Insomnia [] Snoring [] Restless Legs [] Sleep Apnea  [] Daytime Sleepiness  [x] Denies all unless marked  Skin:[] Rash [] Skin Discoloration [x] Denies all unless marked   Neurological: [x]Visual Disturbance/Memory Loss [] Loss of Balance [] Slurred Speech/Weakness [] Seizures  [] Vertigo/Dizziness [x] Denies all unless marked

## 2020-08-10 ENCOUNTER — HOSPITAL ENCOUNTER (OUTPATIENT)
Dept: MRI IMAGING | Age: 70
Discharge: HOME | End: 2020-08-10
Payer: MEDICARE

## 2020-08-10 LAB
GFR AFRICAN AMERICAN: >60
GFR NON-AFRICAN AMERICAN: >60
PERFORMED ON: NORMAL
POC CREATININE: 0.8 MG/DL (ref 0.3–1.3)
POC SAMPLE TYPE: NORMAL

## 2020-08-10 PROCEDURE — 6360000004 HC RX CONTRAST MEDICATION: Performed by: NURSE PRACTITIONER

## 2020-08-10 PROCEDURE — 82565 ASSAY OF CREATININE: CPT

## 2020-08-10 PROCEDURE — 70553 MRI BRAIN STEM W/O & W/DYE: CPT

## 2020-08-10 PROCEDURE — A9577 INJ MULTIHANCE: HCPCS | Performed by: NURSE PRACTITIONER

## 2020-08-10 RX ADMIN — GADOBENATE DIMEGLUMINE 20 ML: 529 INJECTION, SOLUTION INTRAVENOUS at 16:32

## 2020-09-04 ENCOUNTER — TELEPHONE (OUTPATIENT)
Dept: NEUROLOGY | Age: 70
End: 2020-09-04

## 2020-09-09 NOTE — TELEPHONE ENCOUNTER
Spoke with patient and voiced results and next appointment time and date. Patient voiced understanding.

## 2020-11-03 PROBLEM — R07.9 CHEST PAIN: Status: RESOLVED | Noted: 2020-11-03 | Resolved: 2020-11-03

## 2020-11-17 ENCOUNTER — TELEPHONE (OUTPATIENT)
Dept: NEUROSURGERY | Age: 70
End: 2020-11-17

## 2020-12-01 ENCOUNTER — TELEPHONE (OUTPATIENT)
Dept: NEUROSURGERY | Age: 70
End: 2020-12-01

## 2020-12-01 NOTE — TELEPHONE ENCOUNTER
Called patient to r/s a missed appt. From 12/01. Left voicemail message with call back number [phone].

## 2023-05-24 ENCOUNTER — OFFICE VISIT (OUTPATIENT)
Dept: NEUROLOGY | Facility: CLINIC | Age: 73
End: 2023-05-24
Payer: MEDICARE

## 2023-05-24 VITALS
HEART RATE: 99 BPM | HEIGHT: 60 IN | WEIGHT: 185 LBS | BODY MASS INDEX: 36.32 KG/M2 | SYSTOLIC BLOOD PRESSURE: 160 MMHG | DIASTOLIC BLOOD PRESSURE: 80 MMHG | OXYGEN SATURATION: 96 %

## 2023-05-24 DIAGNOSIS — R42 DIZZINESS: ICD-10-CM

## 2023-05-24 DIAGNOSIS — R40.4 EPISODE OF ALTERED CONSCIOUSNESS: Primary | ICD-10-CM

## 2023-05-24 DIAGNOSIS — E78.5 HYPERLIPIDEMIA, UNSPECIFIED HYPERLIPIDEMIA TYPE: ICD-10-CM

## 2023-05-24 DIAGNOSIS — E11.9 TYPE 2 DIABETES MELLITUS WITHOUT COMPLICATION, WITHOUT LONG-TERM CURRENT USE OF INSULIN: Chronic | ICD-10-CM

## 2023-05-24 PROBLEM — R56.9 SEIZURE: Status: ACTIVE | Noted: 2023-02-13

## 2023-05-24 PROBLEM — R05.9 COUGH: Status: RESOLVED | Noted: 2022-11-30 | Resolved: 2023-05-24

## 2023-05-24 PROBLEM — J18.9 PNEUMONIA: Status: RESOLVED | Noted: 2022-03-01 | Resolved: 2023-05-24

## 2023-05-24 PROBLEM — D69.2 NON-THROMBOCYTOPENIC PURPURA: Status: ACTIVE | Noted: 2023-02-13

## 2023-05-24 PROBLEM — E66.01 MORBID OBESITY: Status: ACTIVE | Noted: 2022-02-09

## 2023-05-24 PROBLEM — M79.7 FIBROMYALGIA: Status: ACTIVE | Noted: 2022-03-01

## 2023-05-24 PROBLEM — K21.9 GASTROESOPHAGEAL REFLUX DISEASE: Status: ACTIVE | Noted: 2021-08-24

## 2023-05-24 PROBLEM — G47.30 SLEEP APNEA: Status: ACTIVE | Noted: 2021-04-14

## 2023-05-24 PROBLEM — J06.9 ACUTE UPPER RESPIRATORY INFECTION: Status: RESOLVED | Noted: 2022-08-01 | Resolved: 2023-05-24

## 2023-05-24 PROBLEM — M25.551 PAIN OF RIGHT HIP JOINT: Status: RESOLVED | Noted: 2022-10-14 | Resolved: 2023-05-24

## 2023-05-24 RX ORDER — ONDANSETRON 8 MG/1
8 TABLET, ORALLY DISINTEGRATING ORAL EVERY 8 HOURS PRN
COMMUNITY

## 2023-05-24 RX ORDER — CEPHALEXIN 500 MG/1
1 CAPSULE ORAL 3 TIMES DAILY
COMMUNITY
Start: 2023-04-27

## 2023-05-24 RX ORDER — LISINOPRIL 40 MG/1
40 TABLET ORAL DAILY
COMMUNITY
Start: 2023-05-19

## 2023-05-24 RX ORDER — METOPROLOL SUCCINATE 100 MG/1
100 TABLET, EXTENDED RELEASE ORAL DAILY
COMMUNITY

## 2023-05-24 RX ORDER — PEN NEEDLE, DIABETIC 32GX 5/32"
NEEDLE, DISPOSABLE MISCELLANEOUS
COMMUNITY
Start: 2023-02-10

## 2023-05-24 RX ORDER — LANCETS 33 GAUGE
1 EACH MISCELLANEOUS 3 TIMES DAILY
COMMUNITY
Start: 2023-01-29

## 2023-05-24 RX ORDER — UBIDECARENONE 200 MG
200 CAPSULE ORAL EVERY 24 HOURS
COMMUNITY

## 2023-05-24 RX ORDER — SEMAGLUTIDE 2.68 MG/ML
2 INJECTION, SOLUTION SUBCUTANEOUS WEEKLY
COMMUNITY
Start: 2023-04-26

## 2023-05-24 RX ORDER — FLUCONAZOLE 150 MG/1
150 TABLET ORAL DAILY
COMMUNITY
Start: 2023-05-15

## 2023-05-24 NOTE — PROGRESS NOTES
"    Neurology Consult Note    Referring Provider:   Joshua Kohler PA-C     Reason for Consultation:    Dysarthria  Spells of altered consciousness    Subjective   History of Present Illness:  Cecille Gomze is a 72 y.o. female who presents today for dysarthria and spell of altered consciousness.  She is routinely followed by Han Berger MD for primary care.     She is a rather poor historian and is not exactly sure as to why she is here.  She indicates that she had had a possible seizure around 3 months ago when she was having coffee at the [employer] in [facility].  She states that she would get up from her chair and she would have a syncopal event where her legs buckled and she fell.  This happened on multiple occasions per her report.  However she does not have any specific details as to what happened.  She indicates that she went and was evaluated at [facility] where she was told she had abnormal speech and icing just that she had a seizure.  She is not here for evaluation of this.  She indicates that she has not had this happen any further times.  She denies any incontinence or tongue biting with this event.  She does states she has imbalance with some dizziness at times but has been dealing with dizziness for quite a few years at this point.  She states there are times where she \"gets real tired and has to lay down\".  She denies any episodes of syncope with this feeling.  She does indicate that someone told her she had a stroke secondary to some difficulties in her vision.  She ended up having some injections in her eye and her vision improved.  She did have a MRI back in August 2020 and that I was able to review the report of and it did not indicate any history of CVA or any new CVA on that report.  The time that she had the reported stroke was back somewhere earlier than August 2020.    Allergies:    Dapagliflozin, Penicillins, and Latex    Medications:  Current Outpatient " Medications   Medication Sig Dispense Refill    BD Pen Needle Annita 2nd Gen 32G X 4 MM misc USE WITH INSULIN THREE TIMES DAILY      cephalexin (KEFLEX) 500 MG capsule Take 1 capsule by mouth 3 (Three) Times a Day.      cetirizine (zyrTEC) 10 MG tablet Take  by mouth Daily.  0    Coenzyme Q10 200 MG capsule Take 200 mg by mouth Daily.      diclofenac (VOLTAREN) 75 MG EC tablet TK 1 T PO BID WF  1    fluconazole (DIFLUCAN) 150 MG tablet Take 1 tablet by mouth Daily.      furosemide (LASIX) 40 MG tablet Take 1 tablet by mouth 2 (Two) Times a Day.      gabapentin (NEURONTIN) 600 MG tablet Take 1 tablet by mouth 3 (Three) Times a Day.      glimepiride (AMARYL) 2 MG tablet Take  by mouth Daily.  1    Lancets (OneTouch Delica Plus [id number]) misc 1 each by Other route 3 (Three) Times a Day. use to test blood sugar 3 times daily      lisinopril (PRINIVIL,ZESTRIL) 40 MG tablet Take 1 tablet by mouth Daily.      meclizine (ANTIVERT) 25 MG tablet TK 1 T PO TID PRN  0    metFORMIN (GLUCOPHAGE) 1000 MG tablet Take 1 tablet by mouth 2 (Two) Times a Day With Meals.      metoprolol succinate XL (TOPROL-XL) 100 MG 24 hr tablet Take 1 tablet by mouth Daily.      nitrofurantoin, macrocrystal-monohydrate, (MACROBID) 100 MG capsule       ondansetron ODT (ZOFRAN-ODT) 8 MG disintegrating tablet Place 1 tablet on the tongue Every 8 (Eight) Hours As Needed.      Ozempic, 2 MG/DOSE, 8 MG/3ML solution pen-injector Inject 2 mg under the skin into the appropriate area as directed 1 (One) Time Per Week.       No current facility-administered medications for this visit.     Current outpatient and discharge medications have been reconciled for the patient.  Reviewed by: OSWALDO Lock    Past Medical History:  Past Medical History:   Diagnosis Date    Acute upper respiratory infection 08/01/2022    Arthritis     Chest pain at rest 12/09/2015    Cough 11/30/2022    Diabetes     Hypertension     Pain of right hip joint 10/14/2022     Past  "Surgical History:   Procedure Laterality Date    APPENDECTOMY      CARPAL TUNNEL RELEASE Right     REPLACEMENT TOTAL KNEE Left     SUBTOTAL HYSTERECTOMY      TONSILLECTOMY       Family History   Problem Relation Age of Onset    Alcohol abuse Father     Hypertension Father     Asthma Brother      Social History     Tobacco Use    Smoking status: Never    Smokeless tobacco: Never   Substance Use Topics    Alcohol use: No    Drug use: No     Review of Systems   Constitutional:  Positive for activity change.   Musculoskeletal:  Positive for gait problem.   Neurological:  Positive for dizziness, seizures and syncope. Negative for headache.       Objective   Vital Signs:  Heart Rate:  [99] 99  BP: (160)/(80) 160/80      05/24/23  1331   Weight: 83.9 kg (185 lb)     152.4 cm (60\")  Body mass index is 36.13 kg/m².    Physical Exam  Vitals reviewed.   Constitutional:       Appearance: Normal appearance.   HENT:      Head: Normocephalic.      Mouth/Throat:      Pharynx: Oropharynx is clear.   Eyes:      General: Lids are normal.      Extraocular Movements: Extraocular movements intact.      Pupils: Pupils are equal, round, and reactive to light.   Cardiovascular:      Rate and Rhythm: Normal rate and regular rhythm.      Pulses: Normal pulses.   Pulmonary:      Effort: Pulmonary effort is normal.   Musculoskeletal:         General: Normal range of motion.      Cervical back: Normal range of motion and neck supple.   Skin:     General: Skin is warm and dry.      Capillary Refill: Capillary refill takes less than 2 seconds.   Neurological:      Motor: Motor strength is normal.      Coordination: Coordination is intact.      Deep Tendon Reflexes: Reflexes are normal and symmetric.   Psychiatric:         Mood and Affect: Mood normal.         Speech: Speech normal.     Neurological Exam  Mental Status  Awake, alert and oriented to person, place and time. Recent and remote memory are intact. Speech is normal. Language is fluent " with no aphasia. Attention and concentration are normal.    Cranial Nerves  CN II: Visual acuity is normal. Visual fields full to confrontation.  CN III, IV, VI: Extraocular movements intact bilaterally. Normal lids and orbits bilaterally. Pupils equal round and reactive to light bilaterally.  CN V: Facial sensation is normal.  CN VII: Full and symmetric facial movement.  CN IX, X: Palate elevates symmetrically. Normal gag reflex.  CN XI: Shoulder shrug strength is normal.  CN XII: Tongue midline without atrophy or fasciculations.    Motor   Strength is 5/5 throughout all four extremities.    Sensory  Sensation is intact to light touch, pinprick, vibration and proprioception in all four extremities.    Reflexes  Deep tendon reflexes are 2+ and symmetric in all four extremities.    Coordination    Finger-to-nose, rapid alternating movements and heel-to-shin normal bilaterally without dysmetria.    Gait  Normal casual, toe, heel and tandem gait.    Results Review:    Lab Results   Component Value Date    EGFRIFNONA >60 08/10/2020    EGFRIFAFRI >60 08/10/2020     No results found for: WBC, HGB, HCT, MCV, PLT  No results found for: CHOL, CHLPL, TRIG, HDL, LDL, LDLDIRECT  No results found for: TSH  No results found for: HGBA1C  No results found for: FOLATE  No results found for: [id number]    RADIOLOGY - SCAN - CT HEAD -[facility]-01/28/23 (01/28/2023)     Chart Review:  MEDICATIONS - SCAN - MEDS-[facility]-02/01/23 (02/01/2023)   PROGRESS NOTES - SCAN - PROG NOTE-[facility]-01/27/23 (01/27/2023)      Plan .  Impression:  Cecille Gomez is a 72 y.o. female who presents for still abnormal consciousness concerning for seizure.  During this time she had also had some dysarthria.  She is not exactly sure as to why she is here today.  PCP note from referral indicates a possible stroke with symptoms of heaviness, clumsiness, difficulty finding desired words,  garbled speech, slurred speech, and loss of vision.  However, the patient does not indicate any of these symptoms and does not have any of the symptoms on examination today.  She states that she feels like she is here secondary to the passing out episode and possible seizure that she had.  Her description of this does potentially represent seizure as she indicates that she had a brief moment of dizziness followed by unconsciousness then also embers going to the ED.  She denies any incontinence or tongue biting with this.  She denies any other symptoms since.  At this point I do not feel that we need to have any antiepileptics on board secondary to the fact that she had 1 presumable unprovoked seizure which would produce a 30% risk of recurrent seizure.  We will get an updated MRI with and without contrast and EEG for further evaluation.  This will also help further evaluate her history of stroke whether recent or chronic.  We will obtain lab work to help further stratify her risk of stroke with CMP, CBC, folate, B12, lipid panel, A1c.  If she does have signs of acute or chronic stroke on her MRI then we will further with other risk factor stratification but we will await results of her MRI at this time.  Regardless she should continue with secondary stroke prevention measures at this time.  I expressed all this to her and she is completely understanding with this.    Plan:  MRI brain with without contrast  EEG  CMP, CBC, folate, B12, lipid panel, A1c  Recommend safety precautions  Fall risk/prevention discussed  Call with any other spells concerning for seizure or any other concerns.    The patient and I have discussed the plan of care and she is in full agreement at this time.     Follow-Up:  Return in about 3 months (around 8/24/2023) for Spell of abnormal conciousness.         Juan Carlos Schwartz, OSWALDO  05/24/23  16:45 CDT

## 2023-05-25 ENCOUNTER — PATIENT ROUNDING (BHMG ONLY) (OUTPATIENT)
Dept: NEUROLOGY | Facility: CLINIC | Age: 73
End: 2023-05-25
Payer: MEDICARE

## 2023-05-25 NOTE — PROGRESS NOTES
May 25, 2023    Hello, may I speak with Cecille Gomez?    My name is Cindy ORTIZ CMA      I am  with [facility]  [address]  [facility] [address] [phone].    Before we get started may I verify your date of birth?     I am calling to officially welcome you to our practice and ask about your recent visit. Is this a good time to talk?     Tell me about your visit with us. What things went well?        We're always looking for ways to make our patients' experiences even better. Do you have recommendations on ways we may improve?      Overall were you satisfied with your first visit to our practice?        I appreciate you taking the time to speak with me today. Is there anything else I can do for you?       Thank you, and have a great day.    ATTEMPTED TO CONTACT THE PATIENT. NO ANSWER AND NO VOICEMAIL SET UP.

## 2023-06-23 ENCOUNTER — TELEPHONE (OUTPATIENT)
Dept: NEUROLOGY | Facility: HOSPITAL | Age: 73
End: 2023-06-23

## 2023-08-04 ENCOUNTER — TELEPHONE (OUTPATIENT)
Dept: NEUROLOGY | Facility: CLINIC | Age: 73
End: 2023-08-04
Payer: MEDICARE

## 2023-08-04 NOTE — TELEPHONE ENCOUNTER
Message left requesting a return call.  Received a call from [facility] requesting her lab orders.  Unable to fax to the number they gave, no one is available at the phone number given.

## 2023-08-07 NOTE — TELEPHONE ENCOUNTER
PATIENT CALLED BACK. ADVISED I WOULD SEND FRANCIS A MESSAGE TO RETURN PATIENTS CALL     THANK YOU

## 2023-08-08 NOTE — TELEPHONE ENCOUNTER
[facility] CALLED BACK WITH UPDATED FAX NUMBER     FAX NUMBER : [phone]

## 2023-08-11 ENCOUNTER — TELEPHONE (OUTPATIENT)
Dept: NEUROLOGY | Facility: CLINIC | Age: 73
End: 2023-08-11
Payer: MEDICARE

## 2023-08-11 DIAGNOSIS — E11.9 TYPE 2 DIABETES MELLITUS WITHOUT COMPLICATION, WITHOUT LONG-TERM CURRENT USE OF INSULIN: Chronic | ICD-10-CM

## 2023-08-11 DIAGNOSIS — E78.5 HYPERLIPIDEMIA, UNSPECIFIED HYPERLIPIDEMIA TYPE: ICD-10-CM

## 2023-08-11 DIAGNOSIS — R40.4 EPISODE OF ALTERED CONSCIOUSNESS: ICD-10-CM

## 2023-08-11 NOTE — TELEPHONE ENCOUNTER
Patient called the [facility]UB and was warm transferred to me. Patient states that she was returning a call in regards to lab results. I advised the patient that the missed call was from Friday in regards to making sure we had the correct fax number to send the lab orders to the office of her choosing. I advised the patient that the lab results were received today and that Juan Carlos will be back from paternity leave on Monday and he will review them then. Patient verbalizes understanding.       Patient also states she she has been waiting since February to figure out what is going on with her head , TIA vs seizure. I advised the patient that she no showed an MRI and EEG appointment on 6/26/23. Patient states she was advised to not come in for that due to the excessive heat. I advised the patient that she has a follow up scheduled with Juan Carlos for 9/19/23. I will let juan carlos know of her concerns when he is back in the office on Monday. Patient verbalizes understanding.

## 2023-08-14 ENCOUNTER — TELEPHONE (OUTPATIENT)
Dept: NEUROLOGY | Facility: CLINIC | Age: 73
End: 2023-08-14
Payer: MEDICARE

## 2023-08-14 NOTE — TELEPHONE ENCOUNTER
----- Message from OSWALDO Lock sent at 8/14/2023  9:08 AM CDT -----  Labs look OK.  I read a note from her calling in and I recommend she get the MRI and EEG performed for further evaluation.

## 2023-08-14 NOTE — TELEPHONE ENCOUNTER
CALLED PATIENT TO LET HER KNOW WE GOT HER LAB RESULTS AND EVERYTHING LOOKED GOOD. I TOLD HER HE RECOMMENDED HER TO GET THE MRI AND EEG DONE BEFORE NEXT APPOINTMENT WITH JR IN SEPTEMBER. SHE STATED SHE THOUGHT SHE WAS GETTING THAT DONE IN SEPTEMBER I EXPLAINED SHE WOULD BE SEEING JR IN SEPTEMBER AND WOULD NEED THOSE DONE BEFORE HAND. SHE SAID SHE LIVES AN HOUR AND A HALF AWAY SO SHE WANTED TO HAVE THEM DONE SAME DAY. I TOLD HER THE MRI WOULD HAVE TO BE SCHEDULED BUT I COULD SEND ORDERS TO SOMEWHERE CLOSER IF THAT WOULD BE EASIER. SHE STATED THAT WOULD BE FINE TO SEND TO [facility].

## 2023-08-15 ENCOUNTER — TELEPHONE (OUTPATIENT)
Dept: NEUROLOGY | Facility: CLINIC | Age: 73
End: 2023-08-15
Payer: MEDICARE

## 2023-08-15 NOTE — TELEPHONE ENCOUNTER
PT CALLED ABOUT MRI AND HER APPT. SHE GOT ALL UPSET AND CONFUSED ABOUT WHAT NEEDS TO BE DONE FIRST.  SHE WAS SO UPSET JUST HUNG UP .     PLEASE CK MRI P.A AND LET PT KNOW WHEN CAN HAVE , SHE IS SCHED. IN SEPT TO SEE JR SO ASSUME MRI NEEDS TO BE DONE BEFORE.

## 2023-08-21 ENCOUNTER — TELEPHONE (OUTPATIENT)
Dept: NEUROLOGY | Facility: CLINIC | Age: 73
End: 2023-08-21
Payer: MEDICARE

## 2023-08-21 NOTE — TELEPHONE ENCOUNTER
MSThor TANYA CALLED BACK & I EXPLAINED THAT [facility] WILL CALL HER TO SCHEDULE THE MRI & EEG. SHE VOICED UNDERSTANDING. SHE ALSO, UNDERSTANDS THAT IT WILL NEED TO BE COMPLETED BEFORE HER FOLLOW-UP WITH JR.

## 2023-08-21 NOTE — TELEPHONE ENCOUNTER
I have tried to call Ms. Gomez to schedule her MRI & EEG with [facility]. I have not been able to reach her & her voicemail is full. I have faxed the orders to [facility]; they will contact her to schedule. They will also  notify the office of the date/times of the studies. They will schedule them on the same day.    I will attempt to call Ms. Gomez, again, with the date/times & mail out that information.

## 2023-09-25 DIAGNOSIS — R40.4 EPISODE OF ALTERED CONSCIOUSNESS: ICD-10-CM

## 2023-09-27 ENCOUNTER — TELEPHONE (OUTPATIENT)
Dept: NEUROLOGY | Facility: CLINIC | Age: 73
End: 2023-09-27
Payer: MEDICARE

## 2023-09-27 NOTE — TELEPHONE ENCOUNTER
CALLED PATIENT TO LET HER KNOW THE RESULTS OF HER MRI. I TOLD HER THAT THERE WAS NOTHING NOTED FOR A STROKE BUT THERE ARE AGE RELATED CHANGES SHOWN ON THE MRI. PATIENT VOICED UNDERSTANDING

## 2023-09-27 NOTE — TELEPHONE ENCOUNTER
----- Message from OSWALDO Lock sent at 9/25/2023  1:29 PM CDT -----  Exam limited due to not getting the full set of images.  However, there is atrophy noted and no evidence of stroke.  There is microvascular disease.  Please let her know.

## 2023-10-02 DIAGNOSIS — R40.4 EPISODE OF ALTERED CONSCIOUSNESS: ICD-10-CM

## 2024-02-29 ENCOUNTER — TELEPHONE (OUTPATIENT)
Dept: NEUROSURGERY | Age: 74
End: 2024-02-29

## 2024-02-29 NOTE — TELEPHONE ENCOUNTER
Patient returning call to schedule with Dr. Triston Davies she states her issue is transportation so she needs to know ahead of time and she's trying to work with insurance so they could take her.

## 2024-02-29 NOTE — TELEPHONE ENCOUNTER
Called patient and she stated she would need to call back as she was busy at the moment. I let her know that was fine and to call when she is ready. Patient thanked me and voiced understanding.     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

## 2024-04-02 VITALS — HEIGHT: 60 IN | BODY MASS INDEX: 38.72 KG/M2 | WEIGHT: 197.2 LBS

## 2024-04-02 RX ORDER — AMITRIPTYLINE HYDROCHLORIDE 10 MG/1
1 TABLET, FILM COATED ORAL NIGHTLY
COMMUNITY

## 2024-04-02 RX ORDER — LISINOPRIL 10 MG/1
1 TABLET ORAL DAILY
COMMUNITY

## 2024-04-02 RX ORDER — HYDROCHLOROTHIAZIDE 25 MG/1
25 TABLET ORAL DAILY
COMMUNITY

## 2024-04-02 RX ORDER — CETIRIZINE HYDROCHLORIDE 10 MG/1
10 TABLET ORAL DAILY
COMMUNITY

## 2024-04-02 RX ORDER — GUAIFENESIN 1200 MG/1
TABLET, EXTENDED RELEASE ORAL
COMMUNITY
Start: 2024-01-20

## 2024-04-02 RX ORDER — FLUTICASONE PROPIONATE 50 MCG
SPRAY, SUSPENSION (ML) NASAL
COMMUNITY
Start: 2023-11-04

## 2024-04-02 RX ORDER — SIMVASTATIN 40 MG
1 TABLET ORAL EVERY EVENING
COMMUNITY

## 2024-04-02 RX ORDER — INSULIN GLARGINE 100 [IU]/ML
INJECTION, SOLUTION SUBCUTANEOUS
COMMUNITY

## 2024-04-02 RX ORDER — GLIMEPIRIDE 2 MG/1
TABLET ORAL
COMMUNITY

## 2024-04-02 RX ORDER — BENZONATATE 100 MG/1
CAPSULE ORAL
COMMUNITY

## 2024-04-02 RX ORDER — EMPAGLIFLOZIN 10 MG/1
10 TABLET, FILM COATED ORAL DAILY
COMMUNITY

## 2024-04-02 RX ORDER — DEXTROMETHORPHAN HYDROBROMIDE AND PROMETHAZINE HYDROCHLORIDE 15; 6.25 MG/5ML; MG/5ML
SYRUP ORAL
COMMUNITY

## 2024-04-02 RX ORDER — METOPROLOL SUCCINATE 100 MG/1
100 TABLET, EXTENDED RELEASE ORAL NIGHTLY
COMMUNITY

## 2024-04-02 RX ORDER — OMEPRAZOLE 20 MG/1
CAPSULE, DELAYED RELEASE ORAL
COMMUNITY
Start: 2024-03-02

## 2024-04-04 ENCOUNTER — TELEPHONE (OUTPATIENT)
Dept: NEUROSURGERY | Age: 74
End: 2024-04-04

## 2024-04-04 NOTE — TELEPHONE ENCOUNTER
Called patient back and r/s appt. Pt voiced understanding and stated she would call back if she couldn't arrange a ride.

## 2024-04-04 NOTE — TELEPHONE ENCOUNTER
Gardenia called to reschedule missed appt on 4/2 .     Please be advised that the best time to call her to accommodate their needs is  as soon as possible .     Thank you.

## 2024-05-14 ENCOUNTER — OFFICE VISIT (OUTPATIENT)
Dept: NEUROSURGERY | Age: 74
End: 2024-05-14
Payer: MEDICARE

## 2024-05-14 ENCOUNTER — HOSPITAL ENCOUNTER (OUTPATIENT)
Dept: GENERAL RADIOLOGY | Age: 74
Discharge: HOME | End: 2024-05-14
Payer: MEDICARE

## 2024-05-14 VITALS
SYSTOLIC BLOOD PRESSURE: 120 MMHG | HEART RATE: 86 BPM | DIASTOLIC BLOOD PRESSURE: 69 MMHG | HEIGHT: 60 IN | WEIGHT: 197 LBS | BODY MASS INDEX: 38.68 KG/M2 | RESPIRATION RATE: 18 BRPM

## 2024-05-14 DIAGNOSIS — M43.16 SPONDYLOLISTHESIS AT L4-L5 LEVEL: Primary | ICD-10-CM

## 2024-05-14 DIAGNOSIS — R20.0 NUMBNESS AND TINGLING OF BOTH FEET: ICD-10-CM

## 2024-05-14 DIAGNOSIS — M43.16 SPONDYLOLISTHESIS AT L4-L5 LEVEL: ICD-10-CM

## 2024-05-14 DIAGNOSIS — M51.36 DDD (DEGENERATIVE DISC DISEASE), LUMBAR: ICD-10-CM

## 2024-05-14 DIAGNOSIS — R20.2 NUMBNESS AND TINGLING OF BOTH FEET: ICD-10-CM

## 2024-05-14 PROCEDURE — 3074F SYST BP LT 130 MM HG: CPT | Performed by: NEUROLOGICAL SURGERY

## 2024-05-14 PROCEDURE — G8427 DOCREV CUR MEDS BY ELIG CLIN: HCPCS | Performed by: NEUROLOGICAL SURGERY

## 2024-05-14 PROCEDURE — 3078F DIAST BP <80 MM HG: CPT | Performed by: NEUROLOGICAL SURGERY

## 2024-05-14 PROCEDURE — 3017F COLORECTAL CA SCREEN DOC REV: CPT | Performed by: NEUROLOGICAL SURGERY

## 2024-05-14 PROCEDURE — G8417 CALC BMI ABV UP PARAM F/U: HCPCS | Performed by: NEUROLOGICAL SURGERY

## 2024-05-14 PROCEDURE — 99204 OFFICE O/P NEW MOD 45 MIN: CPT | Performed by: NEUROLOGICAL SURGERY

## 2024-05-14 PROCEDURE — 1123F ACP DISCUSS/DSCN MKR DOCD: CPT | Performed by: NEUROLOGICAL SURGERY

## 2024-05-14 PROCEDURE — G8400 PT W/DXA NO RESULTS DOC: HCPCS | Performed by: NEUROLOGICAL SURGERY

## 2024-05-14 PROCEDURE — 72120 X-RAY BEND ONLY L-S SPINE: CPT

## 2024-05-14 PROCEDURE — 1090F PRES/ABSN URINE INCON ASSESS: CPT | Performed by: NEUROLOGICAL SURGERY

## 2024-05-14 PROCEDURE — 1036F TOBACCO NON-USER: CPT | Performed by: NEUROLOGICAL SURGERY

## 2024-05-14 ASSESSMENT — ENCOUNTER SYMPTOMS
GASTROINTESTINAL NEGATIVE: 1
RESPIRATORY NEGATIVE: 1
EYES NEGATIVE: 1

## 2024-05-14 NOTE — PROGRESS NOTES
[facility]  Office Visit      Chief Complaint   Patient presents with    New Patient     To establish care.     Results     Imaging in PACS.     Back Pain     Patient presents low back pain that is worsening.  Patient states walking and sitting increases her back pain.      Numbness     Patient states she is having numbness and tingling her BLE.        HISTORY OF PRESENT ILLNESS:    Gardenia Hough is a 73 y.o. female with a history of DM, fibromyalgia who is familiar with our clinic as we evaluated her for some of the same complaints in 2019 who presents today with low back pain.      She states she has low back pain, leg pains, joint pains. Pain is present at all times. The pain does radiate into the BLE. Her pain is mostly located in her back.  The patient complains of numbness of the bilateral feet.        Her pain is not changed when going from a seated to standing position. Her pain is worsened with walking. Her pain is not changed when lying flat. Overall, indicative that the patient does not have a mechanical nature to their pain.     She is very poor historian.  She also focuses on seeing neurology.  States she and her family \"sprayed that stuff on the ground that causes neurological problems and may have Parkinson's\".  She has been evaluated by Dr. Ren many years ago in [place], our Neurology team back in 2020 and [facility] in 2023.  States she was very unhappy with her care at [facility].  MRI brain back in 2020 was largely unremarkable.      The patient has underwent a non-operative treatment course that has included:  NSAIDs - diclofenac  Tylenol                Past Medical History:   Diagnosis Date    Arthritis     Diabetes mellitus (HCC)     Fibromyalgia     GERD (gastroesophageal reflux disease)     H/O: hysterectomy     Hyperlipidemia     Hypertension     Mitral insufficiency     Parkinson disease (HCC)        Past Surgical History:   Procedure Laterality Date    APPENDECTOMY

## 2024-05-15 ENCOUNTER — TELEPHONE (OUTPATIENT)
Dept: NEUROSURGERY | Age: 74
End: 2024-05-15

## 2024-05-15 NOTE — TELEPHONE ENCOUNTER
Will need to attempt to obtain the MRI lumbar spine from [facility], either by mail or if they can push a different way??

## 2024-05-21 NOTE — TELEPHONE ENCOUNTER
Per [facility] medical records, patient has only had a CT lumbar spine in the past and it is from 4/2018. That has been pushed to PACS for review if needed.

## 2024-06-13 ENCOUNTER — HOSPITAL ENCOUNTER (OUTPATIENT)
Dept: NEUROLOGY | Age: 74
Discharge: HOME | End: 2024-06-13
Attending: NEUROLOGICAL SURGERY
Payer: MEDICARE

## 2024-06-13 PROBLEM — R20.2 NUMBNESS AND TINGLING OF BOTH FEET: Status: ACTIVE | Noted: 2024-06-13

## 2024-06-13 PROBLEM — R20.0 NUMBNESS AND TINGLING OF BOTH FEET: Status: ACTIVE | Noted: 2024-06-13

## 2024-06-13 PROCEDURE — 95886 MUSC TEST DONE W/N TEST COMP: CPT | Performed by: PSYCHIATRY & NEUROLOGY

## 2024-06-13 PROCEDURE — 95886 MUSC TEST DONE W/N TEST COMP: CPT

## 2024-06-13 PROCEDURE — 95909 NRV CNDJ TST 5-6 STUDIES: CPT | Performed by: PSYCHIATRY & NEUROLOGY

## 2024-06-13 PROCEDURE — 95909 NRV CNDJ TST 5-6 STUDIES: CPT

## 2024-09-03 ENCOUNTER — OFFICE VISIT (OUTPATIENT)
Dept: NEUROSURGERY | Age: 74
End: 2024-09-03
Payer: MEDICARE

## 2024-09-03 VITALS
DIASTOLIC BLOOD PRESSURE: 82 MMHG | HEIGHT: 60 IN | BODY MASS INDEX: 38.69 KG/M2 | SYSTOLIC BLOOD PRESSURE: 153 MMHG | HEART RATE: 74 BPM | WEIGHT: 197.09 LBS

## 2024-09-03 DIAGNOSIS — M43.16 SPONDYLOLISTHESIS AT L4-L5 LEVEL: Primary | ICD-10-CM

## 2024-09-03 DIAGNOSIS — R20.0 NUMBNESS AND TINGLING OF BOTH FEET: ICD-10-CM

## 2024-09-03 DIAGNOSIS — M51.36 DDD (DEGENERATIVE DISC DISEASE), LUMBAR: ICD-10-CM

## 2024-09-03 DIAGNOSIS — R20.2 NUMBNESS AND TINGLING OF BOTH FEET: ICD-10-CM

## 2024-09-03 PROCEDURE — 3077F SYST BP >= 140 MM HG: CPT | Performed by: NURSE PRACTITIONER

## 2024-09-03 PROCEDURE — G8427 DOCREV CUR MEDS BY ELIG CLIN: HCPCS | Performed by: NURSE PRACTITIONER

## 2024-09-03 PROCEDURE — 1036F TOBACCO NON-USER: CPT | Performed by: NURSE PRACTITIONER

## 2024-09-03 PROCEDURE — 1123F ACP DISCUSS/DSCN MKR DOCD: CPT | Performed by: NURSE PRACTITIONER

## 2024-09-03 PROCEDURE — G8400 PT W/DXA NO RESULTS DOC: HCPCS | Performed by: NURSE PRACTITIONER

## 2024-09-03 PROCEDURE — 3017F COLORECTAL CA SCREEN DOC REV: CPT | Performed by: NURSE PRACTITIONER

## 2024-09-03 PROCEDURE — 1090F PRES/ABSN URINE INCON ASSESS: CPT | Performed by: NURSE PRACTITIONER

## 2024-09-03 PROCEDURE — 3079F DIAST BP 80-89 MM HG: CPT | Performed by: NURSE PRACTITIONER

## 2024-09-03 PROCEDURE — 99214 OFFICE O/P EST MOD 30 MIN: CPT | Performed by: NURSE PRACTITIONER

## 2024-09-03 PROCEDURE — G8417 CALC BMI ABV UP PARAM F/U: HCPCS | Performed by: NURSE PRACTITIONER

## 2024-09-03 ASSESSMENT — ENCOUNTER SYMPTOMS
BACK PAIN: 1
RESPIRATORY NEGATIVE: 1
GASTROINTESTINAL NEGATIVE: 1
EYES NEGATIVE: 1

## 2024-09-03 NOTE — PROGRESS NOTES
Review of Systems   Constitutional: Negative.    HENT: Negative.     Eyes: Negative.    Respiratory: Negative.     Cardiovascular: Negative.    Gastrointestinal: Negative.    Genitourinary: Negative.    Musculoskeletal:  Positive for back pain, joint pain and myalgias.   Skin: Negative.    Neurological:  Positive for tingling and weakness.   Endo/Heme/Allergies: Negative.    Psychiatric/Behavioral: Negative.        
tablet Take 1 tablet by mouth every morning (before breakfast)      furosemide (LASIX) 20 MG tablet Take 1 tablet by mouth daily       No current facility-administered medications for this visit.       Allergies:  Latex, Dapagliflozin, Metformin, and Penicillins    Social History:   Social History     Tobacco Use   Smoking Status Unknown   Smokeless Tobacco Never     Social History     Substance and Sexual Activity   Alcohol Use Not Currently    Comment: rarely         Family History:   Family History   Problem Relation Age of Onset    High Blood Pressure Father     Cancer Maternal Grandmother        REVIEW OF SYSTEMS:  Constitutional: Negative.    HENT: Negative.     Eyes: Negative.    Respiratory: Negative.     Cardiovascular: Negative.    Gastrointestinal: Negative.    Genitourinary: Negative.    Musculoskeletal:  Positive for back pain, joint pain and myalgias.   Skin: Negative.    Neurological:  Positive for tingling and weakness.   Endo/Heme/Allergies: Negative.    Psychiatric/Behavioral: Negative.       PHYSICAL EXAM:  Vitals:    09/03/24 1423   BP: (!) 153/82   Pulse: 74       Constitutional: appears well-developed and well-nourished.   Eyes - conjunctiva normal.  Pupils react to light  Ear, nose, throat - hearing intact to finger rub, No scars, masses, or lesions over external nose or ears, no atrophy oftongue  Neck- symmetric, no masses noted, no jugular vein distension  Respiration- chest wall appears symmetric, good expansion, normal effort without use of accessory muscles  Musculoskeletal - no significant wasting of muscles noted, no bony deformities, gait no gross ataxia  Extremities- no clubbing, cyanosis oredema  Skin - warm, dry, and intact.  No rash, erythema, or pallor.  Psychiatric - mood, affect, and behavior appear normal.     Neurologic Examination  Awake, Alert and oriented x 4  Normal speech pattern, following commands    Motor:  RIGHT: hand grasp 5/5    finger extension 5/5    bicep 5/5